# Patient Record
Sex: MALE | NOT HISPANIC OR LATINO | URBAN - METROPOLITAN AREA
[De-identification: names, ages, dates, MRNs, and addresses within clinical notes are randomized per-mention and may not be internally consistent; named-entity substitution may affect disease eponyms.]

---

## 2020-02-04 ENCOUNTER — IMPORTED ENCOUNTER (OUTPATIENT)
Dept: URBAN - METROPOLITAN AREA CLINIC 1 | Facility: CLINIC | Age: 45
End: 2020-02-04

## 2020-02-04 PROBLEM — H52.4: Noted: 2020-02-04

## 2020-02-04 PROBLEM — H52.13: Noted: 2020-02-04

## 2020-02-04 PROCEDURE — S0620 ROUTINE OPHTHALMOLOGICAL EXA: HCPCS

## 2020-02-04 NOTE — PATIENT DISCUSSION
1. Myopia w/ Presbyopia -- Rx was given for correction if indicated and requested. 2. VEENA OU -- Recommend ATs BID OU routinely. Sample of Refresh Relieva given. Return for an appointment in 1 year [address] with Dr. Alba Thorne.

## 2021-06-14 ENCOUNTER — IMPORTED ENCOUNTER (OUTPATIENT)
Dept: URBAN - METROPOLITAN AREA CLINIC 1 | Facility: CLINIC | Age: 46
End: 2021-06-14

## 2021-06-14 PROBLEM — H52.13: Noted: 2021-06-14

## 2021-06-14 PROBLEM — H52.223: Noted: 2021-06-14

## 2021-06-14 PROBLEM — H52.4: Noted: 2021-06-14

## 2021-06-14 PROCEDURE — S0621 ROUTINE OPHTHALMOLOGICAL EXA: HCPCS

## 2021-06-14 NOTE — PATIENT DISCUSSION
1. Myopia w/ Astigmatism OU -- Rx was given for correction if indicated and requested. 2. Presbyopia 3. Dry Eyes OU -- Cont ATs BID OU routinely. Return for an appointment in 1 year [address] with Dr[address] Desirae Ch.

## 2021-12-16 NOTE — PATIENT DISCUSSION
Reviewed ERG with pt. All baseline testing done. Pt stable. Will refer to [facility] for ongoing care in 6 months.

## 2022-04-02 ASSESSMENT — TONOMETRY
OS_IOP_MMHG: 14
OS_IOP_MMHG: 15
OD_IOP_MMHG: 14
OD_IOP_MMHG: 14

## 2022-04-02 ASSESSMENT — VISUAL ACUITY
OD_CC: J1
OD_CC: J1
OS_SC: 20/20
OS_CC: J1
OS_SC: 20/20
OS_CC: J1
OD_SC: 20/20
OD_SC: 20/20

## 2022-08-09 NOTE — PATIENT DISCUSSION
*** 9/14/22  JAYDEN REVIEWED HVF.  EVIDENCE OF MILD GLAUCOMA OU.  VF CONSISTENT W/ OPTIC NERVE.  RECOMMEND CANALOPLASTY W/ ISTENT OU AT TIME OF CE/IOL.

## 2022-08-09 NOTE — PATIENT DISCUSSION
NEEDS VF UPDATED TO DETERMINE IF THERE IS ENOUGH EVIDENCE OF GLAUCOMA +/- MIGS AND IF PT IS A CANDIDATE FOR ADVANCED.

## 2022-09-06 NOTE — PATIENT DISCUSSION
*** 9/14/22  [address] [facility] [place] REVIEWED HVF.  EVIDENCE OF MILD GLAUCOMA OU.  VF CONSISTENT W/ OPTIC NERVE.  RECOMMEND CANALOPLASTY W/ ISTENT OU AT TIME OF CE/IOL.  KENDALL.

## 2022-09-06 NOTE — PATIENT DISCUSSION
**09/22/22 Per pt, ok to use Vigamox/Moxifloxacin. Only had muscle pain while she took oral antibiotics years ago. Jennifer**.

## 2022-10-10 NOTE — PATIENT DISCUSSION
*** 9/14/22  Anastasia Artis REVIEWED HVF.  EVIDENCE OF MILD GLAUCOMA OU.  VF CONSISTENT W/ OPTIC NERVE.  RECOMMEND CANALOPLASTY W/ ISTENT OU AT TIME OF CE/IOL.  HBFRANCES.

## 2022-10-10 NOTE — PATIENT DISCUSSION
*** 9/14/22  [address] [facility] REVIEWED HVF.  EVIDENCE OF MILD GLAUCOMA OU.  VF CONSISTENT W/ OPTIC NERVE.  RECOMMEND CANALOPLASTY W/ ISTENT OU AT TIME OF CE/IOL.  KENDALL.

## 2023-05-09 ENCOUNTER — COMPREHENSIVE EXAM (OUTPATIENT)
Dept: URBAN - METROPOLITAN AREA CLINIC 1 | Facility: CLINIC | Age: 48
End: 2023-05-09

## 2023-05-09 DIAGNOSIS — H52.13: ICD-10-CM

## 2023-05-09 DIAGNOSIS — H52.4: ICD-10-CM

## 2023-05-09 DIAGNOSIS — H52.223: ICD-10-CM

## 2023-05-09 DIAGNOSIS — Z01.00: ICD-10-CM

## 2023-05-09 PROCEDURE — 92014 COMPRE OPH EXAM EST PT 1/>: CPT

## 2023-05-09 PROCEDURE — 92015 DETERMINE REFRACTIVE STATE: CPT

## 2023-05-09 ASSESSMENT — VISUAL ACUITY
OS_CC: 20/25+2
OD_CC: J1+
OD_CC: 20/25
OS_CC: J1+

## 2023-05-09 ASSESSMENT — TONOMETRY
OS_IOP_MMHG: 14
OD_IOP_MMHG: 14

## 2024-07-02 ENCOUNTER — COMPREHENSIVE EXAM (OUTPATIENT)
Dept: URBAN - METROPOLITAN AREA CLINIC 1 | Facility: CLINIC | Age: 49
End: 2024-07-02

## 2024-07-02 DIAGNOSIS — H52.13: ICD-10-CM

## 2024-07-02 DIAGNOSIS — H52.223: ICD-10-CM

## 2024-07-02 DIAGNOSIS — H52.4: ICD-10-CM

## 2024-07-02 DIAGNOSIS — Z01.00: ICD-10-CM

## 2024-07-02 PROCEDURE — 92015 DETERMINE REFRACTIVE STATE: CPT

## 2024-07-02 PROCEDURE — 92014 COMPRE OPH EXAM EST PT 1/>: CPT

## 2024-07-02 ASSESSMENT — VISUAL ACUITY
OS_CC: 20/20
OD_CC: 20/20

## 2024-07-02 ASSESSMENT — TONOMETRY
OS_IOP_MMHG: 14
OD_IOP_MMHG: 15

## 2025-07-03 ENCOUNTER — COMPREHENSIVE EXAM (OUTPATIENT)
Age: 50
End: 2025-07-03

## 2025-07-03 DIAGNOSIS — H52.223: ICD-10-CM

## 2025-07-03 DIAGNOSIS — H52.13: ICD-10-CM

## 2025-07-03 DIAGNOSIS — H52.4: ICD-10-CM

## 2025-07-03 DIAGNOSIS — Z01.00: ICD-10-CM

## 2025-07-03 PROCEDURE — 92014 COMPRE OPH EXAM EST PT 1/>: CPT

## 2025-07-03 PROCEDURE — 92015 DETERMINE REFRACTIVE STATE: CPT
